# Patient Record
Sex: FEMALE
[De-identification: names, ages, dates, MRNs, and addresses within clinical notes are randomized per-mention and may not be internally consistent; named-entity substitution may affect disease eponyms.]

---

## 2018-06-22 ENCOUNTER — HOSPITAL ENCOUNTER (EMERGENCY)
Dept: HOSPITAL 14 - H.ER | Age: 53
Discharge: HOME | End: 2018-06-22
Payer: COMMERCIAL

## 2018-06-22 VITALS
HEART RATE: 96 BPM | RESPIRATION RATE: 19 BRPM | DIASTOLIC BLOOD PRESSURE: 89 MMHG | TEMPERATURE: 98.2 F | OXYGEN SATURATION: 100 % | SYSTOLIC BLOOD PRESSURE: 144 MMHG

## 2018-06-22 DIAGNOSIS — L02.92: Primary | ICD-10-CM

## 2018-06-22 NOTE — ED PDOC
HPI: General Adult


Time Seen by Provider: 06/22/18 21:47


Chief Complaint (Nursing): Back Pain


Chief Complaint (Provider): pustular lesions


History Per: Patient


Additional Complaint(s): 


53-year-old female presents with multiple boils to bilateral thighs and hips. 

She states this has been ongoing for 1 month. Patient was seen at a clinic one 

week ago and was started on Keflex but symptoms have not improved. She denies 

any active drainage, denies fever or chills.





PMD:  clinic in [place]





Past Medical History


Reviewed: Historical Data, Nursing Documentation, Vital Signs


Vital Signs: 





 Last Vital Signs











Temp  98.2 F   06/22/18 21:43


 


Pulse  96 H  06/22/18 21:43


 


Resp  19   06/22/18 21:43


 


BP  144/89   06/22/18 21:43


 


Pulse Ox  100   06/22/18 21:43














- Medical History


PMH: No Chronic Diseases





- Family History


Family History: States: No Known Family Hx





- Living Arrangements


Living Arrangements: Alone





- Social History


Current smoker - smoking cessation education provided: No


Alcohol: None


Drugs: Denies





- Home Medications


Home Medications: 


 Ambulatory Orders











 Medication  Instructions  Recorded


 


Amoxicillin/Clavulanate [Augmentin 1 tab PO BID #14 tab 06/22/18





875 MG-125 MG]  


 


Clindamycin [Cleocin] 300 mg PO QID #28 cap 06/22/18


 


Ibuprofen [Motrin Tab] 800 mg PO Q8 PRN #20 tab 06/22/18














- Allergies


Allergies/Adverse Reactions: 


 Allergies











Allergy/AdvReac Type Severity Reaction Status Date / Time


 


No Known Allergies Allergy   Verified 06/22/18 21:42














Review of Systems


ROS Statement: Except As Marked, All Systems Reviewed And Found Negative


Constitutional: Negative for: Fever


Skin: Positive for: Other (multiple pustular lesions)





Physical Exam





- Reviewed


Nursing Documentation Reviewed: Yes


Vital Signs Reviewed: Yes





- Physical Exam


Appears: Positive for: Well, Non-toxic, No Acute Distress


Skin: Positive for: Normal Color, Rash (Multiple pustular lesions noted to 

bilateral hips and medial thighs, no active drainage, no central pointing or 

fluctuance noted to lesions. Mild localized erythema noted to all lesions with 

no diffuse cellulitis)


Eye Exam: Positive for: Normal appearance


Cardiovascular/Chest: Positive for: Regular Rate, Rhythm


Respiratory: Positive for: Normal Breath Sounds.  Negative for: Wheezing, 

Respiratory Distress


Extremity: Positive for: Normal ROM


Neurologic/Psych: Positive for: Alert, Oriented





- ECG


O2 Sat by Pulse Oximetry: 100


Pulse Ox Interpretation: Normal





Medical Decision Making


Medical Decision Making: 


Impression: multiple boils





Patient was instructed to discontinue use of Keflex and was given prescriptions 

for Augmentin, clindamycin and Motrin. Patient was referred to clinic for follow

-up in 2-3 days and is aware she can return any time if acutely worse





Disposition





- Clinical Impression


Clinical Impression: 


 Boils








- Patient ED Disposition


Is Patient to be Admitted: No


Counseled Patient/Family Regarding: Diagnosis, Need For Followup, Rx Given





- Disposition


Referrals: 


[facility] [Outside]


Disposition: Routine/Home


Disposition Time: 21:58


Condition: STABLE


Additional Instructions: 


Wash area daily with soap and water. Motrin as needed for pain.  Take 

prescription meds as directed. Follow-up with clinic in 2-3 days.


Prescriptions: 


Amoxicillin/Clavulanate [Augmentin 875 MG-125 MG] 1 tab PO BID #14 tab


Clindamycin [Cleocin] 300 mg PO QID #28 cap


Ibuprofen [Motrin Tab] 800 mg PO Q8 PRN #20 tab


 PRN Reason: Pain, Moderate (4-7)


Instructions:  Boil


Forms:  Define My Style (Japanese)


Print Language: Yakut